# Patient Record
Sex: FEMALE | Race: BLACK OR AFRICAN AMERICAN
[De-identification: names, ages, dates, MRNs, and addresses within clinical notes are randomized per-mention and may not be internally consistent; named-entity substitution may affect disease eponyms.]

---

## 2017-08-12 NOTE — ER DOCUMENT REPORT
ED Oral Problem





- General


Mode of Arrival: Ambulatory


Information source: Patient


TRAVEL OUTSIDE OF THE [place] IN LAST 30 DAYS: No





- General


Chief Complaint: Toothache


Stated Complaint: TOOTHACHE


Notes: 


Patient is a 33 week pregnant 22-year-old female who presents to the emergency 

department today with complaints of dental pain.  Patient states she was lying 

down asleep and all of a sudden woke up with this tooth pain.  Patient 

requested narcotics several times despite being pregnant and was redirected 

each time.  Patient has no facial swelling or difficulty breathing. (FLORIAN CAMACHO)





- Related Data


Allergies/Adverse Reactions: 


 





No Known Allergies Allergy (Unverified 08/11/17 23:53)


 











Past Medical History





- General


Information source: Patient





- Social History


Smoking Status: Never Smoker


Cigarette use (# per day): No


Frequency of alcohol use: None


Drug Abuse: None


Lives with: Family


Family History: Reviewed & Not Pertinent


Patient has suicidal ideation: No


Patient has homicidal ideation: No


Pulmonary Medical History: Reports: Hx Asthma


Surgical Hx: Negative





Review of Systems





- Review of Systems


Constitutional: No symptoms reported


EENT: See HPI, Dental problem


Cardiovascular: No symptoms reported


Respiratory: No symptoms reported


Gastrointestinal: No symptoms reported


Genitourinary: No symptoms reported


Female Genitourinary: See HPI, Pregnant


Musculoskeletal: No symptoms reported


Skin: No symptoms reported


Hematologic/Lymphatic: No symptoms reported


Neurological/Psychological: No symptoms reported


-: Yes All other systems reviewed and negative





Physical Exam





- Vital signs


Vitals: 


 











Temp Pulse Resp BP Pulse Ox


 


 98.4 F   70   20   143/92 H  99 


 


 08/11/17 23:50  08/11/17 23:50  08/11/17 23:50  08/11/17 23:50  08/11/17 23:50














- Notes


Notes: 


Physical Exam:


 


General: Alert, tearful. 


 


HEENT: Normocephalic. Atraumatic. PERRLA. Extraocular movements intact. 

Oropharynx clear. Right lower tooth is chipped but has been for quite some 

time. No secondary sign of infection, no periapical abscess, trismus, or 

stridor. Patient handling secretions appropriately. No facial swelling. Airway 

is patent. 


 


Neck: Supple. 


 


Respiratory: No respiratory distress. 


 


Abdominal: Gravid uterus. 


 


Extremities: Moves all four extremities.


 


Neurological: Cranial nerves II-XII grossly intact bilaterally. Normal 

cognition. AAOx4. Normal speech.  


 


Psychological: Normal affect. Normal Mood. 


 


Skin: Warm. Dry. Normal color. (FLORIAN CAMACHO)





Course





- Re-evaluation


Re-evalutation: 


08/12/17 02:08


Patient presents emerged from [facility] toothache she is here with her dad 

visiting from out of town and is 3 3 weeks pregnant.  She said she just started 

developing tooth pain out of nowhere tonight but then upon further examination 

her tooth is been chipped for a long time.  She denies any injury denies that 

she was just lying there and all of a sudden it started.  On examination the 

tooth in question is chipped and has been so for quite some time.  There is no 

associated periapical abscess swelling trismus stridor or drooling no jaw neck 

or facial swelling or signs of abscess.  Patient is requesting narcotics I 

explained to her and I do not use narcotics in chipped tooth with no associated 

abscess in third trimester pregnancy explained to her that the only thing I am 

comfortable doing at this point is giving her a penicillin and a Tylenol.  She 

is from [place] I told her she can follow-up with her dental physician but 

they will likely not do anything until the baby is delivered nonetheless when 

she gets back to [place] she can follow-up with her primary care OB or 

dental physician and discussed reasons for ED return sooner





08/12/17 02:10


 (MAGDIEL HDEZ)





- Vital Signs


Vital signs: 


 











Temp Pulse Resp BP Pulse Ox


 


 98.3 F   73   18   141/78 H  100 


 


 08/12/17 02:20  08/12/17 02:20  08/12/17 02:20  08/12/17 02:20  08/12/17 02:20














Discharge





- Discharge


Clinical Impression: 


 Fractured tooth, Third trimester pregnancy





Condition: Stable


Disposition: HOME, SELF-CARE


Instructions:  Toothache ([facility]), Penicillin V K ([facility])


Additional Instructions: 


Toothache





     Your pain is due to dental decay.  The tooth must be repaired in order for 

you to feel better.  You will, therefore, be referred to a dentist.


     Severe swelling or drainage around a tooth usually means a deep dental 

abscess.  This also requires evaluation and treatment by the dentist, but 

antibiotics may be prescribed while awaiting dental treatment.


     You should be rechecked immediately if you develop major swelling of the 

face, increasing pain, a lump in the jaw or gums, headache, or fever.








You are in your third trimester pregnancy and the only thing safe for you to 

take is Tylenol he can take 2 Tylenol tablets over-the-counter every 4-6 hours.

  And follow-up with your dental physician in [place].





Prescriptions: 


Penicillin V Potassium [Penicillin Vk 250 mg Tablet] 250 mg PO Q6 #40 tablet


Scribe Attestation: 





08/12/17 02:08


I personally performed the services described in the documentation reviewed the 

documentation recorded by my scribe in my presence and it accurately and 

completely records my words and actions (MAGDIEL HDEZ)





Scribe Documentation





- Scribe


Written by Abbey:: Abbey Valdez, 8/12/2017 [id number]


acting as scribe for :: Hood

## 2020-09-22 ENCOUNTER — HOSPITAL ENCOUNTER (EMERGENCY)
Dept: HOSPITAL 62 - ER | Age: 25
Discharge: HOME | End: 2020-09-22
Payer: MEDICAID

## 2020-09-22 VITALS — SYSTOLIC BLOOD PRESSURE: 142 MMHG | DIASTOLIC BLOOD PRESSURE: 80 MMHG

## 2020-09-22 DIAGNOSIS — Z3A.01: ICD-10-CM

## 2020-09-22 DIAGNOSIS — M54.5: ICD-10-CM

## 2020-09-22 DIAGNOSIS — R10.2: ICD-10-CM

## 2020-09-22 DIAGNOSIS — O23.591: Primary | ICD-10-CM

## 2020-09-22 LAB
ADD MANUAL DIFF: NO
ALBUMIN SERPL-MCNC: 4.1 G/DL (ref 3.5–5)
ALP SERPL-CCNC: 59 U/L (ref 38–126)
ANION GAP SERPL CALC-SCNC: 8 MMOL/L (ref 5–19)
APPEARANCE UR: CLEAR
APTT PPP: YELLOW S
AST SERPL-CCNC: 22 U/L (ref 14–36)
BASOPHILS # BLD AUTO: 0 10^3/UL (ref 0–0.2)
BASOPHILS NFR BLD AUTO: 0.7 % (ref 0–2)
BILIRUB DIRECT SERPL-MCNC: 0.2 MG/DL (ref 0–0.4)
BILIRUB SERPL-MCNC: 0.8 MG/DL (ref 0.2–1.3)
BILIRUB UR QL STRIP: NEGATIVE
BUN SERPL-MCNC: 13 MG/DL (ref 7–20)
CALCIUM: 9.5 MG/DL (ref 8.4–10.2)
CHLORIDE SERPL-SCNC: 105 MMOL/L (ref 98–107)
CO2 SERPL-SCNC: 23 MMOL/L (ref 22–30)
EOSINOPHIL # BLD AUTO: 0 10^3/UL (ref 0–0.6)
EOSINOPHIL NFR BLD AUTO: 0.4 % (ref 0–6)
ERYTHROCYTE [DISTWIDTH] IN BLOOD BY AUTOMATED COUNT: 13.3 % (ref 11.5–14)
GLUCOSE SERPL-MCNC: 69 MG/DL (ref 75–110)
GLUCOSE UR STRIP-MCNC: NEGATIVE MG/DL
HCT VFR BLD CALC: 39.2 % (ref 36–47)
HGB BLD-MCNC: 13.4 G/DL (ref 12–15.5)
KETONES UR STRIP-MCNC: NEGATIVE MG/DL
LYMPHOCYTES # BLD AUTO: 2.1 10^3/UL (ref 0.5–4.7)
LYMPHOCYTES NFR BLD AUTO: 36.3 % (ref 13–45)
MCH RBC QN AUTO: 29.3 PG (ref 27–33.4)
MCHC RBC AUTO-ENTMCNC: 34.1 G/DL (ref 32–36)
MCV RBC AUTO: 86 FL (ref 80–97)
MONOCYTES # BLD AUTO: 0.5 10^3/UL (ref 0.1–1.4)
MONOCYTES NFR BLD AUTO: 7.8 % (ref 3–13)
NEUTROPHILS # BLD AUTO: 3.2 10^3/UL (ref 1.7–8.2)
NEUTS SEG NFR BLD AUTO: 54.8 % (ref 42–78)
NITRITE UR QL STRIP: NEGATIVE
PH UR STRIP: 7 [PH] (ref 5–9)
PLATELET # BLD: 253 10^3/UL (ref 150–450)
POTASSIUM SERPL-SCNC: 3.9 MMOL/L (ref 3.6–5)
PROT SERPL-MCNC: 7 G/DL (ref 6.3–8.2)
PROT UR STRIP-MCNC: NEGATIVE MG/DL
RBC # BLD AUTO: 4.55 10^6/UL (ref 3.72–5.28)
SP GR UR STRIP: 1.02
TOTAL CELLS COUNTED % (AUTO): 100 %
UROBILINOGEN UR-MCNC: NEGATIVE MG/DL (ref ?–2)
WBC # BLD AUTO: 5.8 10^3/UL (ref 4–10.5)

## 2020-09-22 PROCEDURE — 76817 TRANSVAGINAL US OBSTETRIC: CPT

## 2020-09-22 PROCEDURE — 84702 CHORIONIC GONADOTROPIN TEST: CPT

## 2020-09-22 PROCEDURE — 80053 COMPREHEN METABOLIC PANEL: CPT

## 2020-09-22 PROCEDURE — 85025 COMPLETE CBC W/AUTO DIFF WBC: CPT

## 2020-09-22 PROCEDURE — 99284 EMERGENCY DEPT VISIT MOD MDM: CPT

## 2020-09-22 PROCEDURE — 81001 URINALYSIS AUTO W/SCOPE: CPT

## 2020-09-22 PROCEDURE — 36415 COLL VENOUS BLD VENIPUNCTURE: CPT

## 2020-09-22 PROCEDURE — 93976 VASCULAR STUDY: CPT

## 2020-09-22 NOTE — RADIOLOGY REPORT (SQ)
EXAM DESCRIPTION:  U/S OB TRANSVAG W/DOPPLER



IMAGES COMPLETED DATE/TIME:  9/22/2020 11:53 am



REASON FOR STUDY:  pelvic cramping



COMPARISON:  None.



TECHNIQUE:  Transvaginal static and realtime grayscale images acquired of the pelvis. Additional sudarshan
cted spectral and color Doppler images recorded. All images stored on PACs.

bHCG: Pending.

CLINICAL DATES:  Not Available.



LIMITATIONS:  None.



FINDINGS:  FETUS:  Single Living intrauterine pregnancy.

ULTRASOUND EGA: 5 week 6 day.

ULTRASOUND PERRY: 5/19/2021.

EFW: Not applicable less than 20 weeks.

CRL:  0.31 cm.

FHR: Fetal cardiac activity identified on real-time imaging.  Too small to obtain an accurate heart r
ate.

FETAL SURVEY: No visualized anomalies.

AMNIOTIC FLUID: Adequate amount.

PLACENTA: Not yet developed due to early gestation.

SUBCHORIONIC BLEED: Yes.

SIZE OF BLEED: 3 x 5 x 9 mm.

UTERUS: No masses. No anomalies.

CERVICAL LENGTH: 2.3 cm.   Closed.

RIGHT ADNEXA: Normal ovary with normal vascular flow.

No adnexal free fluid.

Corpus Luteum measuring 1.9 cm.

LEFT ADNEXA: Normal ovary with normal vascular flow.

No adnexal free fluid.

No adnexal masses.

FREE FLUID: None.

OTHER: No other significant finding.



IMPRESSION:  LIVING INTRAUTERINE PREGNANCY.

EGA 5 WEEK 6 DAY.

SMALL SUBCHRONIC BLEED.

Trimester of pregnancy: First trimester - 0 to 13 weeks.



TECHNICAL DOCUMENTATION:  JOB ID:  [id number]

 2011 [employer]- All Rights Reserved                            rev-5/18



Reading location - IP/workstation name: [email or web address]

## 2020-09-22 NOTE — ER DOCUMENT REPORT
ED Medical Screen (RME)





- General


Stated Complaint: VAGINAL CRAMPING


Time Seen by Provider: 20 10:44


TRAVEL OUTSIDE OF THE [place] IN LAST 30 DAYS: No





- HPI


Notes: 





20 11:13


25-year-old female G3, P1 presents emergency room for pelvic cramping 2 days 

ago, malodorous vaginal discharge for the last week.  Reports her last menstrual

cycle was on August 15, she did have an IUD removed in , did not have a 

period in July and her last menstrual cycle was in August.  She is sexually 

active without any contraception.  patient reports pregnancies included an 

, then she had an ectopic, and her last pregnancy was successful.  

Denies any nausea vomiting or diarrhea, denies any fevers or chills.  Denies any

vaginal bleeding.





I have greeted and performed a rapid initial assessment of this patient.  A 

comprehensive ED assessment and evaluation of the patient, analysis of test 

results and completion of the medical decision making process will be conducted 

by additional ED providers.





PHYSICAL EXAMINATION:





CV: s1, s2 regular 





LUNGS: No respiratory distress





abd: lower pelvic pain. 





20 11:18








- Related Data


Allergies/Adverse Reactions: 


                                        





No Known Allergies Allergy (Unverified 17 23:53)


   











Past Medical History


Pulmonary Medical History: Reports: Hx Asthma


Renal/ Medical History: Denies: Hx Peritoneal Dialysis





Physical Exam





- Vital signs


Vitals: 





                                        











Temp Pulse Resp BP Pulse Ox


 


 98.0 F   74   14   132/71 H  99 


 


 20 10:30  20 10:30  20 10:30  20 10:30  20 10:30














Course





- Vital Signs


Vital signs: 





                                        











Temp Pulse Resp BP Pulse Ox


 


 98.0 F   74   14   132/71 H  99 


 


 20 10:30  20 10:30  20 10:30  20 10:30  20 10:30

## 2020-09-22 NOTE — ER DOCUMENT REPORT
ED General





- General


Chief Complaint: Pelvic Problem


Stated Complaint: VAGINAL CRAMPING


Time Seen by Provider: 09/22/20 10:44


Mode of Arrival: Ambulatory


Information source: Patient


TRAVEL OUTSIDE OF THE [place] IN LAST 30 DAYS: No





- HPI


Notes: 





Patient complains of 3 to 4 days of bilateral abdominal cramping with some 

radiation to lower back.  Is intermittent.  Nothing makes better or worse.  She 

denies any vaginal bleeding.  She states she does have vaginal discharge and 

feels exactly like when she has had bacterial vaginosis before.  She states she 

is not concerned about any type of sexually transmitted disease.  No vomiting 

but she does have some nausea.  She states she is unsure if she is pregnant.  

The pain is been crampy in nature and mild to moderate in intensity.





- Related Data


Allergies/Adverse Reactions: 


                                        





No Known Allergies Allergy (Unverified 08/11/17 23:53)


   











Past Medical History





- General


Information source: Patient





- Social History


Smoking Status: Never Smoker


Frequency of alcohol use: Rare


Drug Abuse: None


Family History: Reviewed & Not Pertinent


Pulmonary Medical History: Reports: Hx Asthma


Renal/ Medical History: Denies: Hx Peritoneal Dialysis





Review of Systems





- Review of Systems


Constitutional: denies: Chills, Fever


Cardiovascular: denies: Chest pain, Palpitations - Discharge on this morning


Respiratory: denies: Cough, Short of breath


-: Yes All other systems reviewed and negative





Physical Exam





- Vital signs


Vitals: 


                                        











Temp Pulse Resp BP Pulse Ox


 


 98.0 F   74   14   132/71 H  99 


 


 09/22/20 10:30  09/22/20 10:30  09/22/20 10:30  09/22/20 10:30  09/22/20 10:30











Interpretation: Normal





- General


General appearance: Appears well, Alert





- HEENT


Head: Normocephalic, Atraumatic


Eyes: Normal


Pupils: PERRL





- Respiratory


Respiratory status: No respiratory distress


Chest status: Nontender


Breath sounds: Normal


Chest palpation: Normal





- Cardiovascular


Rhythm: Regular


Heart sounds: Normal auscultation


Murmur: No





- Abdominal


Inspection: Normal


Distension: No distension


Bowel sounds: Normal


Tenderness: Nontender


Organomegaly: No organomegaly





- Back


Back: Normal, Nontender





- Extremities


General upper extremity: Normal inspection, Nontender, Normal color, Normal ROM,

Normal temperature


General lower extremity: Normal inspection, Nontender, Normal color, Normal ROM,

Normal temperature, Normal weight bearing.  No: Yoko's sign





- Neurological


Neuro grossly intact: Yes


Cognition: Normal


Orientation: AAOx4


Kurt Coma Scale Eye Opening: Spontaneous


Bancroft Coma Scale Verbal: Oriented


Bancroft Coma Scale Motor: Obeys Commands


Bancroft Coma Scale Total: 15


Speech: Normal


Motor strength normal: LUE, RUE, LLE, RLE


Sensory: Normal





- Psychological


Associated symptoms: Normal affect, Normal mood





- Skin


Skin Temperature: Warm


Skin Moisture: Dry


Skin Color: Normal





Course





- Re-evaluation


Re-evalutation: 





09/22/20 14:04


Patient presents with some vaginal discharge and lower abdominal cramping.  She 

is pregnant with a documented intrauterine pregnancy by ultrasound.  No evidence

of tubal or ectopic pregnancy.  No free fluid.  Vital signs are stable.  She 

states that she does have vaginal discharge similar to when she has had 

vaginosis before.  Patient declines a pelvic exam.  I also offered to let the 

patient swab herself but she declined to do this as well.  She states she just 

wants medicine for vaginosis because she has had a before.  I will prescribe the

patient a prescription for MetroGel.  Patient states that she is in a hurry 

because she needs to go  her daughter and declines further evaluation.





- Vital Signs


Vital signs: 


                                        











Temp Pulse Resp BP Pulse Ox


 


 98.0 F   66   18   142/80 H  99 


 


 09/22/20 14:00  09/22/20 14:00  09/22/20 14:00  09/22/20 14:00  09/22/20 14:00














- Laboratory


Result Diagrams: 


                                 09/22/20 11:10





                                 09/22/20 11:10


Laboratory results interpreted by me: 


                                        











  09/22/20





  11:10


 


Sodium  135.7 L


 


Glucose  69 L


 


Beta HCG, Quant  18328.00 H














- Diagnostic Test


Radiology reviewed: Image reviewed, Reports reviewed





Discharge





- Discharge


Clinical Impression: 


 Vaginosis, Abdominal pain during intrauterine pregnancy





Condition: Stable


Disposition: HOME, SELF-CARE


Instructions:  Pregnancy ([facility])


Prescriptions: 


Metronidazole [Metrogel 0.75% Vaginal Gel] 1 applic PV DAILY 7 Days #7 tube


Referrals: 


EFRAIN MULLINS MD [ACTIVE STAFF] - Follow up in 1 week